# Patient Record
Sex: FEMALE | Race: WHITE | NOT HISPANIC OR LATINO | Employment: FULL TIME | URBAN - METROPOLITAN AREA
[De-identification: names, ages, dates, MRNs, and addresses within clinical notes are randomized per-mention and may not be internally consistent; named-entity substitution may affect disease eponyms.]

---

## 2023-05-31 ENCOUNTER — OFFICE VISIT (OUTPATIENT)
Dept: SURGERY | Facility: CLINIC | Age: 53
End: 2023-05-31

## 2023-05-31 VITALS — WEIGHT: 293 LBS | HEIGHT: 66 IN | BODY MASS INDEX: 47.09 KG/M2

## 2023-05-31 DIAGNOSIS — R19.5 POSITIVE COLORECTAL CANCER SCREENING USING DNA-BASED STOOL TEST: Primary | ICD-10-CM

## 2023-05-31 PROCEDURE — 99203 OFFICE O/P NEW LOW 30 MIN: CPT | Performed by: PHYSICIAN ASSISTANT

## 2023-05-31 RX ORDER — BUSPIRONE HYDROCHLORIDE 15 MG/1
30 TABLET ORAL 3 TIMES DAILY
COMMUNITY
Start: 2023-05-26

## 2023-05-31 RX ORDER — DOXYCYCLINE HYCLATE 100 MG/1
100 CAPSULE ORAL DAILY
COMMUNITY
Start: 2012-05-11

## 2023-05-31 RX ORDER — SPIRONOLACTONE 25 MG/1
25 TABLET ORAL DAILY
COMMUNITY
Start: 2022-11-04

## 2023-05-31 RX ORDER — DIPHENOXYLATE HYDROCHLORIDE AND ATROPINE SULFATE 2.5; .025 MG/1; MG/1
1 TABLET ORAL DAILY
COMMUNITY
Start: 2014-08-13

## 2023-05-31 RX ORDER — TRAZODONE HYDROCHLORIDE 100 MG/1
100 TABLET ORAL NIGHTLY
COMMUNITY
Start: 2023-05-25

## 2023-05-31 NOTE — PROGRESS NOTES
ASSESSMENT/PLAN:    This is a 52-year-old lady presenting to the office today with a positive Cologuard history.  She is asymptomatic.  With this recent positive test I am recommending proceeding with a colonoscopy for further diagnostic purposes.  Risks and rationale associated with it were discussed with her with risks including but not limited to bleeding, infection, bowel perforation and the need for additional procedures.  Any additional follow-up will be discussed with her once the results have been reviewed.  All questions were answered and she was willing to proceed with all recommendations.    CC:    Positive Cologuard test    HPI:    This is a 52-year-old lady presenting to the office today as a self-referral for a recent positive Cologuard test.  She is asymptomatic, denying abdominal pain, distention, changes to their bowel habits, no dark tarry stools, no bright red blood with her bowel movements, no unexpected weight loss or any other complaints.    ENDOSCOPY:   • Colonoscopy: Never    LABS:    • Cologuard: Positive    SOCIAL HISTORY:   • Denies tobacco use  • Occasional alcohol use    FAMILY HISTORY:    • Colorectal cancer: Maternal aunt    PREVIOUS ABDOMINAL SURGERY    • Laparoscopic cholecystectomy    OTHER SURGERY  Past Surgical History:   Procedure Laterality Date   • CHOLECYSTECTOMY     • FOOT SURGERY      x2       PAST MEDICAL HISTORY:    Past Medical History:   Diagnosis Date   • PONV (postoperative nausea and vomiting) 2012 after I had my gallbladder removed       MEDICATIONS:     Current Outpatient Medications:   •  busPIRone (BUSPAR) 15 MG tablet, Take 2 tablets by mouth 3 (Three) Times a Day., Disp: , Rfl:   •  doxycycline (VIBRAMYCIN) 100 MG capsule, Take 1 capsule by mouth Daily., Disp: , Rfl:   •  multivitamin (THERAGRAN) tablet tablet, Take 1 tablet by mouth Daily., Disp: , Rfl:   •  spironolactone (ALDACTONE) 25 MG tablet, Take 1 tablet by mouth Daily., Disp: , Rfl:   •  traZODone  "(DESYREL) 100 MG tablet, Take 1 tablet by mouth Every Night., Disp: , Rfl:     ALLERGIES:   Allergies   Allergen Reactions   • Azithromycin Unknown - Low Severity   • Cephalosporins Unknown - High Severity     Pt states it sent her to the ER   • Erythromycin Unknown - Low Severity   • Penicillins Unknown - Low Severity   • Cephalexin Rash       PHYSICAL EXAM:   • Constitutional: Well-developed well-nourished, no acute distress  • Vital signs:   o Height 66\"  o Weight 294  o BMI 47.5 Discussed with patient increased perioperative risks associated with obesity including increased risks of DVT, infection, seromas, poor wound healing and hernias (with abdominal surgery).  • Neck: Supple, no palpable mass, trachea midline  • Respiratory: Clear to auscultation, normal inspiratory effort  • Cardiovascular: Regular rate, no murmur, no carotid bruit  • Gastrointestinal: Soft, nontender  • Psychiatric: Alert and oriented ×3, normal affect         Aftab Dawkins PA-C    [facility] - General Surgery   [address], [address]  [place] [address]     [address], [address]  [place] [address]     Office: [phone]  Fax: [phone]    "

## 2023-05-31 NOTE — H&P (VIEW-ONLY)
ASSESSMENT/PLAN:    This is a 52-year-old lady presenting to the office today with a positive Cologuard history.  She is asymptomatic.  With this recent positive test I am recommending proceeding with a colonoscopy for further diagnostic purposes.  Risks and rationale associated with it were discussed with her with risks including but not limited to bleeding, infection, bowel perforation and the need for additional procedures.  Any additional follow-up will be discussed with her once the results have been reviewed.  All questions were answered and she was willing to proceed with all recommendations.    CC:    Positive Cologuard test    HPI:    This is a 52-year-old lady presenting to the office today as a self-referral for a recent positive Cologuard test.  She is asymptomatic, denying abdominal pain, distention, changes to their bowel habits, no dark tarry stools, no bright red blood with her bowel movements, no unexpected weight loss or any other complaints.    ENDOSCOPY:   • Colonoscopy: Never    LABS:    • Cologuard: Positive    SOCIAL HISTORY:   • Denies tobacco use  • Occasional alcohol use    FAMILY HISTORY:    • Colorectal cancer: Maternal aunt    PREVIOUS ABDOMINAL SURGERY    • Laparoscopic cholecystectomy    OTHER SURGERY  Past Surgical History:   Procedure Laterality Date   • CHOLECYSTECTOMY     • FOOT SURGERY      x2       PAST MEDICAL HISTORY:    Past Medical History:   Diagnosis Date   • PONV (postoperative nausea and vomiting) 2012 after I had my gallbladder removed       MEDICATIONS:     Current Outpatient Medications:   •  busPIRone (BUSPAR) 15 MG tablet, Take 2 tablets by mouth 3 (Three) Times a Day., Disp: , Rfl:   •  doxycycline (VIBRAMYCIN) 100 MG capsule, Take 1 capsule by mouth Daily., Disp: , Rfl:   •  multivitamin (THERAGRAN) tablet tablet, Take 1 tablet by mouth Daily., Disp: , Rfl:   •  spironolactone (ALDACTONE) 25 MG tablet, Take 1 tablet by mouth Daily., Disp: , Rfl:   •  traZODone  "(DESYREL) 100 MG tablet, Take 1 tablet by mouth Every Night., Disp: , Rfl:     ALLERGIES:   Allergies   Allergen Reactions   • Azithromycin Unknown - Low Severity   • Cephalosporins Unknown - High Severity     Pt states it sent her to the ER   • Erythromycin Unknown - Low Severity   • Penicillins Unknown - Low Severity   • Cephalexin Rash       PHYSICAL EXAM:   • Constitutional: Well-developed well-nourished, no acute distress  • Vital signs:   o Height 66\"  o Weight 294  o BMI 47.5 Discussed with patient increased perioperative risks associated with obesity including increased risks of DVT, infection, seromas, poor wound healing and hernias (with abdominal surgery).  • Neck: Supple, no palpable mass, trachea midline  • Respiratory: Clear to auscultation, normal inspiratory effort  • Cardiovascular: Regular rate, no murmur, no carotid bruit  • Gastrointestinal: Soft, nontender  • Psychiatric: Alert and oriented ×3, normal affect         Aftab Dawkins PA-C    [facility] - General Surgery   [address], [address]  [place] [address]     [address], [address]  [place] [address]     Office: [phone]  Fax: [phone]    "

## 2023-06-14 ENCOUNTER — ANESTHESIA EVENT (OUTPATIENT)
Dept: PERIOP | Facility: HOSPITAL | Age: 53
End: 2023-06-14
Payer: COMMERCIAL

## 2023-06-14 RX ORDER — IBUPROFEN 400 MG/1
400 TABLET ORAL EVERY 6 HOURS PRN
COMMUNITY

## 2023-06-14 RX ORDER — ASPIRIN 81 MG/1
81 TABLET ORAL DAILY
COMMUNITY

## 2023-06-14 NOTE — ANESTHESIA PREPROCEDURE EVALUATION
Anesthesia Evaluation     Patient summary reviewed and Nursing notes reviewed   history of anesthetic complications:  PONV  NPO Solid Status: > 8 hours  NPO Liquid Status: > 4 hours           Airway   Mallampati: II  TM distance: >3 FB  Neck ROM: full  No difficulty expected  Dental - normal exam     Pulmonary - negative pulmonary ROS and normal exam   Cardiovascular - negative cardio ROS and normal exam  Exercise tolerance: good (4-7 METS)        Neuro/Psych- negative ROS  GI/Hepatic/Renal/Endo    (+) morbid obesity, thyroid problem hypothyroidism    Musculoskeletal (-) negative ROS    Abdominal   (+) obese   Substance History   (+) alcohol use (OCC)     OB/GYN negative ob/gyn ROS         Other                 Allergies   Allergen Reactions    Azithromycin Unknown - Low Severity    Cephalosporins Unknown - High Severity     Pt states it sent her to the ER    Erythromycin Unknown - Low Severity    Penicillins Unknown - Low Severity    Adhesive Tape Itching    Cephalexin Rash     Past Medical History:   Diagnosis Date    Hypothyroid 06/07/2023    will start thyroid med after colonoscopy    PONV (postoperative nausea and vomiting) 2012 after I had my gallbladder removed     Past Surgical History:   Procedure Laterality Date    CHOLECYSTECTOMY  2012    FOOT SURGERY      x2     No results found for: WBC, HGB, HCT, MCV, PLT   No results found for: GLUCOSE, CALCIUM, NA, K, CO2, CL, BUN, CREATININE, EGFRRESULT, EGFR, BCR, ANIONGAP     No current facility-administered medications on file prior to encounter.     Current Outpatient Medications on File Prior to Encounter   Medication Sig Dispense Refill    aspirin 81 MG EC tablet Take 1 tablet by mouth Daily.      busPIRone (BUSPAR) 15 MG tablet Take 2 tablets by mouth 3 (Three) Times a Day.      spironolactone (ALDACTONE) 25 MG tablet Take 1 tablet by mouth Daily.      traZODone (DESYREL) 100 MG tablet Take 1 tablet by mouth Every Night.      doxycycline (VIBRAMYCIN) 100 MG  capsule Take 1 capsule by mouth Daily.      ibuprofen (ADVIL,MOTRIN) 400 MG tablet Take 1 tablet by mouth Every 6 (Six) Hours As Needed for Mild Pain.      multivitamin (THERAGRAN) tablet tablet Take 1 tablet by mouth Daily.        Family History   Problem Relation Age of Onset    Hypertension Mother             Heart disease Father             Hypertension Father     Colon cancer Maternal Aunt       Social History     Socioeconomic History    Marital status:    Tobacco Use    Smoking status: Never    Smokeless tobacco: Never   Vaping Use    Vaping Use: Never used   Substance and Sexual Activity    Alcohol use: Yes     Alcohol/week: 1.0 standard drink     Types: 1 Glasses of wine per week     Comment: Rarely drink alcohol    Drug use: Never    Sexual activity: Yes     Partners: Male     Birth control/protection: Vasectomy      No orders to display                          Anesthesia Plan    ASA 2     MAC   total IV anesthesia  intravenous induction     Anesthetic plan, risks, benefits, and alternatives have been provided, discussed and informed consent has been obtained with: patient.    Use of blood products discussed with patient  Consented to blood products.      CODE STATUS:

## 2023-06-15 ENCOUNTER — ANESTHESIA (OUTPATIENT)
Dept: PERIOP | Facility: HOSPITAL | Age: 53
End: 2023-06-15
Payer: COMMERCIAL

## 2023-06-15 ENCOUNTER — HOSPITAL ENCOUNTER (OUTPATIENT)
Facility: HOSPITAL | Age: 53
Setting detail: HOSPITAL OUTPATIENT SURGERY
Discharge: HOME | End: 2023-06-15
Attending: SURGERY | Admitting: SURGERY
Payer: COMMERCIAL

## 2023-06-15 VITALS
OXYGEN SATURATION: 100 % | HEIGHT: 66 IN | HEART RATE: 59 BPM | TEMPERATURE: 97.2 F | WEIGHT: 293 LBS | SYSTOLIC BLOOD PRESSURE: 152 MMHG | BODY MASS INDEX: 47.09 KG/M2 | DIASTOLIC BLOOD PRESSURE: 78 MMHG | RESPIRATION RATE: 16 BRPM

## 2023-06-15 PROCEDURE — 25010000002 PROPOFOL 200 MG/20ML EMULSION: Performed by: REGISTERED NURSE

## 2023-06-15 RX ORDER — MEPERIDINE HYDROCHLORIDE 25 MG/ML
12.5 INJECTION INTRAMUSCULAR; INTRAVENOUS; SUBCUTANEOUS
Status: DISCONTINUED | OUTPATIENT
Start: 2023-06-15 | End: 2023-06-15 | Stop reason: HOSPADM

## 2023-06-15 RX ORDER — SODIUM CHLORIDE, SODIUM LACTATE, POTASSIUM CHLORIDE, CALCIUM CHLORIDE 600; 310; 30; 20 MG/100ML; MG/100ML; MG/100ML; MG/100ML
9 INJECTION, SOLUTION INTRAVENOUS CONTINUOUS
Status: DISCONTINUED | OUTPATIENT
Start: 2023-06-15 | End: 2023-06-15 | Stop reason: HOSPADM

## 2023-06-15 RX ORDER — SODIUM CHLORIDE 9 MG/ML
40 INJECTION, SOLUTION INTRAVENOUS AS NEEDED
Status: DISCONTINUED | OUTPATIENT
Start: 2023-06-15 | End: 2023-06-15 | Stop reason: HOSPADM

## 2023-06-15 RX ORDER — SODIUM CHLORIDE 0.9 % (FLUSH) 0.9 %
10 SYRINGE (ML) INJECTION EVERY 12 HOURS SCHEDULED
Status: DISCONTINUED | OUTPATIENT
Start: 2023-06-15 | End: 2023-06-15 | Stop reason: HOSPADM

## 2023-06-15 RX ORDER — LIDOCAINE HYDROCHLORIDE 20 MG/ML
INJECTION, SOLUTION INFILTRATION; PERINEURAL AS NEEDED
Status: DISCONTINUED | OUTPATIENT
Start: 2023-06-15 | End: 2023-06-15 | Stop reason: SURG

## 2023-06-15 RX ORDER — SODIUM CHLORIDE, SODIUM LACTATE, POTASSIUM CHLORIDE, CALCIUM CHLORIDE 600; 310; 30; 20 MG/100ML; MG/100ML; MG/100ML; MG/100ML
100 INJECTION, SOLUTION INTRAVENOUS CONTINUOUS
Status: DISCONTINUED | OUTPATIENT
Start: 2023-06-15 | End: 2023-06-15 | Stop reason: HOSPADM

## 2023-06-15 RX ORDER — LIDOCAINE HYDROCHLORIDE 10 MG/ML
0.5 INJECTION, SOLUTION EPIDURAL; INFILTRATION; INTRACAUDAL; PERINEURAL ONCE AS NEEDED
Status: DISCONTINUED | OUTPATIENT
Start: 2023-06-15 | End: 2023-06-15 | Stop reason: HOSPADM

## 2023-06-15 RX ORDER — SODIUM CHLORIDE 0.9 % (FLUSH) 0.9 %
10 SYRINGE (ML) INJECTION AS NEEDED
Status: DISCONTINUED | OUTPATIENT
Start: 2023-06-15 | End: 2023-06-15 | Stop reason: HOSPADM

## 2023-06-15 RX ORDER — PROPOFOL 10 MG/ML
INJECTION, EMULSION INTRAVENOUS AS NEEDED
Status: DISCONTINUED | OUTPATIENT
Start: 2023-06-15 | End: 2023-06-15 | Stop reason: SURG

## 2023-06-15 RX ORDER — ONDANSETRON 2 MG/ML
4 INJECTION INTRAMUSCULAR; INTRAVENOUS ONCE AS NEEDED
Status: DISCONTINUED | OUTPATIENT
Start: 2023-06-15 | End: 2023-06-15 | Stop reason: HOSPADM

## 2023-06-15 RX ADMIN — PROPOFOL INJECTABLE EMULSION 75 MCG/KG/MIN: 10 INJECTION, EMULSION INTRAVENOUS at 15:30

## 2023-06-15 RX ADMIN — SODIUM CHLORIDE, POTASSIUM CHLORIDE, SODIUM LACTATE AND CALCIUM CHLORIDE 9 ML/HR: 600; 310; 30; 20 INJECTION, SOLUTION INTRAVENOUS at 13:48

## 2023-06-15 RX ADMIN — PROPOFOL INJECTABLE EMULSION 100 MG: 10 INJECTION, EMULSION INTRAVENOUS at 15:27

## 2023-06-15 RX ADMIN — PROPOFOL INJECTABLE EMULSION 100 MG: 10 INJECTION, EMULSION INTRAVENOUS at 15:29

## 2023-06-15 RX ADMIN — LIDOCAINE HYDROCHLORIDE 50 MG: 20 INJECTION, SOLUTION INFILTRATION; PERINEURAL at 15:26

## 2023-06-15 NOTE — OP NOTE
PREOPERATIVE DIAGNOSIS:  • Positive Cologuard    POSTOPERATIVE DIAGNOSIS AND FINDINGS:  • Sigmoid diverticulosis    PROCEDURE:  Colonoscopy to terminal ileum    SURGEON:  Diallo Montejo MD    ANESTHESIA:  MAC    SPECIMEN(S):  • none    DESCRIPTION:  In decubitus position digital rectal exam was normal. Colonoscope inserted under direct visualization of lumen to cecum confirmed by visualization of ileocecal valve and appendiceal orifice.  Ileocecal valve was intubated and terminal ileum was grossly normal.  Scope was slowly withdrawn circumferentially examining all mucosal surfaces.  Bowel preparation was excellent and there were no mucosal abnormalities.  Moderate sigmoid diverticulosis was noted.  Tolerated well.    RECOMMENDATION FOR FUTURE SURVEILLANCE:  10 years    Diallo Montejo M.D.

## 2023-06-15 NOTE — ANESTHESIA POSTPROCEDURE EVALUATION
Patient: Silvina León    Procedure Summary       Date: 06/15/23 Room / Location: [facility] ENDOSCOPY 2 /  [facility]    Anesthesia Start: 1525 Anesthesia Stop: 1550    Procedure: COLONOSCOPY Diagnosis:       Positive colorectal cancer screening using DNA-based stool test      (Positive colorectal cancer screening using DNA-based stool test [R19.5])    Surgeons: Diallo Montejo MD Provider: Momo Rodgers CRNA    Anesthesia Type: MAC ASA Status: 2            Anesthesia Type: MAC    Vitals  Vitals Value Taken Time   BP     Temp 97.2 °F (36.2 °C) 06/15/23 1550   Pulse     Resp     SpO2             Post Anesthesia Care and Evaluation    Patient location during evaluation: bedside  Patient participation: complete - patient participated  Level of consciousness: awake and alert  Pain score: 0  Pain management: adequate    Airway patency: patent  Anesthetic complications: No anesthetic complications  PONV Status: none  Cardiovascular status: acceptable  Respiratory status: acceptable  Hydration status: acceptable

## (undated) DEVICE — SOL IRR H2O BTL 1000ML STRL

## (undated) DEVICE — Device

## (undated) DEVICE — KT ORCA ORCAPOD DISP STRL

## (undated) DEVICE — SAFELINER SUCTION CANISTER 1000CC: Brand: DEROYAL

## (undated) DEVICE — VIAL FORMALIN CAP 10P 40ML

## (undated) DEVICE — BW-412T DISP COMBO CLEANING BRUSH: Brand: SINGLE USE COMBINATION CLEANING BRUSH

## (undated) DEVICE — ADAPT CLN BIOGUARD AIR/H2O DISP